# Patient Record
Sex: MALE | Race: WHITE | NOT HISPANIC OR LATINO | Employment: FULL TIME | URBAN - METROPOLITAN AREA
[De-identification: names, ages, dates, MRNs, and addresses within clinical notes are randomized per-mention and may not be internally consistent; named-entity substitution may affect disease eponyms.]

---

## 2019-07-26 ENCOUNTER — TELEPHONE (OUTPATIENT)
Dept: GASTROENTEROLOGY | Facility: CLINIC | Age: 59
End: 2019-07-26

## 2019-08-08 ENCOUNTER — OFFICE VISIT (OUTPATIENT)
Dept: GASTROENTEROLOGY | Facility: CLINIC | Age: 59
End: 2019-08-08
Payer: COMMERCIAL

## 2019-08-08 VITALS
HEIGHT: 67 IN | BODY MASS INDEX: 25.58 KG/M2 | DIASTOLIC BLOOD PRESSURE: 71 MMHG | WEIGHT: 163 LBS | SYSTOLIC BLOOD PRESSURE: 116 MMHG

## 2019-08-08 DIAGNOSIS — K21.9 GASTROESOPHAGEAL REFLUX DISEASE, ESOPHAGITIS PRESENCE NOT SPECIFIED: Primary | ICD-10-CM

## 2019-08-08 DIAGNOSIS — R10.13 DYSPEPSIA: ICD-10-CM

## 2019-08-08 DIAGNOSIS — R63.4 WEIGHT LOSS: ICD-10-CM

## 2019-08-08 DIAGNOSIS — R10.11 RUQ PAIN: ICD-10-CM

## 2019-08-08 PROCEDURE — 99999 PR PBB SHADOW E&M-EST. PATIENT-LVL III: CPT | Mod: PBBFAC,,, | Performed by: INTERNAL MEDICINE

## 2019-08-08 PROCEDURE — 99999 PR PBB SHADOW E&M-EST. PATIENT-LVL III: ICD-10-PCS | Mod: PBBFAC,,, | Performed by: INTERNAL MEDICINE

## 2019-08-08 PROCEDURE — 99205 OFFICE O/P NEW HI 60 MIN: CPT | Mod: S$GLB,,, | Performed by: INTERNAL MEDICINE

## 2019-08-08 PROCEDURE — 99205 PR OFFICE/OUTPT VISIT, NEW, LEVL V, 60-74 MIN: ICD-10-PCS | Mod: S$GLB,,, | Performed by: INTERNAL MEDICINE

## 2019-08-08 NOTE — PROGRESS NOTES
GASTROENTEROLOGY CLINIC NOTE    Reason for visit: The primary encounter diagnosis was Gastroesophageal reflux disease, esophagitis presence not specified. Diagnoses of Weight loss, Dyspepsia, and RUQ pain were also pertinent to this visit.  Referring provider/PCP: Ozzie Alvarado MD      HPI:  Hawk Dunham is a 59 y.o. male here today for new patient eval for weight loss, reflux, epigastric pain, and RUQ pain. Accompanied by his wife today.    He has had many years of epigastric distress as well as some retrosternal burning, worse at night and worse after meals.  He takes Tums regularly at night with some relief of the symptoms.  He is also taking his wife's Prilosec at times with decent relief as well. He is currently not on any prescribed daily medications he has had 2 bouts of right upper quadrant pain, below the ribcage, both times being after meals.  The pain was colicky in nature.  He admits to approximately 20 lb weight loss over the past few years.  He has episodes of bloating and indigestion daily related to these symptoms. Denies SOB. He denies intentional weight loss.  He denies fear of eating.  Has never had an upper endoscopy.  No changes in bowel habits, no blood in the stool, no vomiting.    Social, works as . Recently quit smoking, and I congratulated him on this.      GI ROS:  Reflux: yes  Dysphagia: No   Constipation: No  Diarrhea: No  Rectal bleeding/Melena/hematemesis: No  NSAIDs: No  Anticoagulation: No  Anti-platelet therapy: No    Prior Endoscopy:  EGD: none    Colon:  Around 2011 - colonoscopy , he states normal, recd 10 year repeat. (done in [place], no prior records avail)    (Portions of this note were dictated using voice recognition software and may contain dictation related errors in spelling/grammar/syntax not found on text review)    Review of Systems   Constitutional: Positive for weight loss. Negative for fever.   HENT: Negative for hearing loss and  "nosebleeds.    Eyes: Negative for double vision and photophobia.   Respiratory: Negative for cough and shortness of breath.    Cardiovascular: Negative for chest pain and leg swelling.   Gastrointestinal: Positive for abdominal pain, heartburn and nausea. Negative for blood in stool and melena.   Genitourinary: Negative for dysuria and hematuria.   Musculoskeletal: Negative for back pain and neck pain.   Skin: Negative for itching and rash.   Neurological: Negative for dizziness and headaches.   Psychiatric/Behavioral: Negative for hallucinations. The patient does not have insomnia.        Past Medical History: has a past medical history of Hyperlipidemia.    Past Surgical History: has a past surgical history that includes Appendectomy and Knee arthroscopy w/ meniscal repair.    Family History:family history is not on file.    Allergies: Review of patient's allergies indicates:  No Known Allergies    Social History: reports that he has been smoking.  He has a 40.00 pack-year smoking history. He does not have any smokeless tobacco history on file. He reports that he drinks alcohol.    Home medications:   Current Outpatient Medications on File Prior to Visit   Medication Sig Dispense Refill    [DISCONTINUED] simvastatin (ZOCOR) 40 MG tablet Take 40 mg by mouth nightly.  10    [DISCONTINUED] hydrocodone-acetaminophen 5-325mg (NORCO) 5-325 mg per tablet Take 1-2 tablets by mouth every 4 to 6 hours as needed for Pain. 50 tablet 0    [DISCONTINUED] ketorolac (TORADOL) 10 mg tablet Take 1 tablet (10 mg total) by mouth every 6 (six) hours. 50 tablet 0     No current facility-administered medications on file prior to visit.        Vital signs:  /71   Ht 5' 7" (1.702 m)   Wt 73.9 kg (163 lb)   BMI 25.53 kg/m²     Physical Exam   Constitutional: He is oriented to person, place, and time. He appears well-developed and well-nourished. No distress.   HENT:   Head: Normocephalic and atraumatic.   Eyes: Conjunctivae are " normal. No scleral icterus.   Neck: Neck supple. No thyromegaly present.   Cardiovascular: Normal rate, regular rhythm and intact distal pulses.   Pulmonary/Chest: Effort normal and breath sounds normal. No respiratory distress.   Abdominal:   Soft nontender ; nonrigid; no masses   Musculoskeletal: Normal range of motion. He exhibits no tenderness.   Neurological: He is alert and oriented to person, place, and time.   Skin: Skin is warm and dry. No rash noted.   Psychiatric: He has a normal mood and affect. His behavior is normal.   Vitals reviewed.      Routine labs:  Lab Results   Component Value Date    WBC 8.32 09/06/2016    HGB 15.6 09/06/2016    HCT 46.7 09/06/2016    MCV 98 09/06/2016     (L) 09/06/2016     Lab Results   Component Value Date    INR 0.9 11/01/2013     No results found for: IRON, FERRITIN, TIBC, FESATURATED  Lab Results   Component Value Date     09/06/2016    K 4.2 09/06/2016     09/06/2016    CO2 28 09/06/2016    BUN 12 09/06/2016    CREATININE 0.8 09/06/2016     No results found for: ALBUMIN, ALT, AST, GGT, ALKPHOS, BILITOT  No results found for: GLUCOSE    Imaging:  Reviewed  Reviewed CT from 2016 - fatty liver and cholelithiasis.    Assessment:    1. Gastroesophageal reflux disease, esophagitis presence not specified    2. Weight loss    3. Dyspepsia    4. RUQ pain        Plan:    Orders Placed This Encounter    US Abdomen Limited    Case request GI: EGD (ESOPHAGOGASTRODUODENOSCOPY)     I suspect that some of his symptoms are related to GERD as well as possible biliary colic.  I will start with an upper endoscopy and obtain a right upper quadrant ultrasound to further evaluate.    We discussed that he should also undergo repeat screening colonoscopy in the next year or two based on his 10 year interval last time.      RTC  2 months      Nigel Barker MD  [facility] [place]

## 2019-08-08 NOTE — PATIENT INSTRUCTIONS
TIPS for GERD:  If taking PPI, always take at least 45 minutes before first protein containing meal of the day  Stop smoking  Do NOT lie down within 2 -3 hours of eating  Elevate the head of your bed 6 inches (blocks under the head of the bed are better than pillows)   - consider MedCline pillow  Exercise and weight loss   Drink more water (between meals)                EGD Prep Instructions    [facility] [address]  Clinic Office [phone]  Endoscopy Lab [phone]    You are scheduled for an EGD with Dr. Barker on 9/4/19 at [facility].  You will check in at [facility] on the first floor of the hospital.    Nothing to eat or drink after midnight before the procedure.  You MAY brush your teeth.    You MAY take your blood pressure, heart, and seizure medication on the morning of the procedure, with a SIP of water.  Hold ALL other medications until after the procedure.    If you are on blood thinners THAT YOU HAVE BEEN INSTRUCTED TO HOLD BY YOUR DOCTOR FOR THIS PROCEDURE, then do NOT take this the morning of your EGD.  Do NOT stop these medications on your own, they must be approved to be held by your doctor.  Your EGD can NOT be done if you are on these medications.  Examples of blood thinners include: Coumadin, Aggrenox, Plavix, Pradaxa, Reapro, Pletal, Xarelto, Ticagrelor, Brilinta, Eliquis, and high dose aspirin (325 mg).  You do not have to stop baby aspirin 81 mg.    You will receive a call 2 days before your EGD to tell you the time to arrive.  If you have not received a call by the day before your procedure, call the Endoscopy Lab at [phone].

## 2019-08-21 ENCOUNTER — TELEPHONE (OUTPATIENT)
Dept: GASTROENTEROLOGY | Facility: CLINIC | Age: 59
End: 2019-08-21

## 2019-08-21 NOTE — TELEPHONE ENCOUNTER
Spoke with patients wife and she would like to cancel the procedure because they don't have the money to pay the co-pay.

## 2021-02-14 ENCOUNTER — HOSPITAL ENCOUNTER (EMERGENCY)
Facility: HOSPITAL | Age: 61
Discharge: HOME | End: 2021-02-14
Attending: EMERGENCY MEDICINE
Payer: COMMERCIAL

## 2021-02-14 VITALS
WEIGHT: 150 LBS | RESPIRATION RATE: 19 BRPM | HEIGHT: 67 IN | TEMPERATURE: 98 F | BODY MASS INDEX: 23.54 KG/M2 | DIASTOLIC BLOOD PRESSURE: 89 MMHG | SYSTOLIC BLOOD PRESSURE: 143 MMHG | OXYGEN SATURATION: 99 % | HEART RATE: 46 BPM

## 2021-02-14 DIAGNOSIS — R07.9 CHEST PAIN: ICD-10-CM

## 2021-02-14 DIAGNOSIS — R09.1 PLEURISY: Primary | ICD-10-CM

## 2021-02-14 LAB
ALBUMIN SERPL BCP-MCNC: 4 G/DL (ref 3.5–5.2)
ALP SERPL-CCNC: 40 U/L (ref 55–135)
ALT SERPL W/O P-5'-P-CCNC: 74 U/L (ref 10–44)
ANION GAP SERPL CALC-SCNC: 5 MMOL/L (ref 8–16)
AST SERPL-CCNC: 55 U/L (ref 10–40)
BASOPHILS # BLD AUTO: 0.04 K/UL (ref 0–0.2)
BASOPHILS NFR BLD: 0.8 % (ref 0–1.9)
BILIRUB SERPL-MCNC: 0.6 MG/DL (ref 0.1–1)
BNP SERPL-MCNC: 46 PG/ML (ref 0–99)
BUN SERPL-MCNC: 9 MG/DL (ref 8–23)
CALCIUM SERPL-MCNC: 8.7 MG/DL (ref 8.7–10.5)
CHLORIDE SERPL-SCNC: 106 MMOL/L (ref 95–110)
CO2 SERPL-SCNC: 29 MMOL/L (ref 23–29)
CREAT SERPL-MCNC: 0.8 MG/DL (ref 0.5–1.4)
D DIMER PPP IA.FEU-MCNC: 1.86 MG/L FEU
DIFFERENTIAL METHOD: ABNORMAL
EOSINOPHIL # BLD AUTO: 0.2 K/UL (ref 0–0.5)
EOSINOPHIL NFR BLD: 3.9 % (ref 0–8)
ERYTHROCYTE [DISTWIDTH] IN BLOOD BY AUTOMATED COUNT: 13.2 % (ref 11.5–14.5)
EST. GFR  (AFRICAN AMERICAN): >60 ML/MIN/1.73 M^2
EST. GFR  (NON AFRICAN AMERICAN): >60 ML/MIN/1.73 M^2
GLUCOSE SERPL-MCNC: 99 MG/DL (ref 70–110)
HCT VFR BLD AUTO: 46.2 % (ref 40–54)
HGB BLD-MCNC: 15.8 G/DL (ref 14–18)
IMM GRANULOCYTES # BLD AUTO: 0.01 K/UL (ref 0–0.04)
IMM GRANULOCYTES NFR BLD AUTO: 0.2 % (ref 0–0.5)
LYMPHOCYTES # BLD AUTO: 1.6 K/UL (ref 1–4.8)
LYMPHOCYTES NFR BLD: 30.6 % (ref 18–48)
MCH RBC QN AUTO: 33.4 PG (ref 27–31)
MCHC RBC AUTO-ENTMCNC: 34.2 G/DL (ref 32–36)
MCV RBC AUTO: 98 FL (ref 82–98)
MONOCYTES # BLD AUTO: 0.6 K/UL (ref 0.3–1)
MONOCYTES NFR BLD: 11.3 % (ref 4–15)
NEUTROPHILS # BLD AUTO: 2.7 K/UL (ref 1.8–7.7)
NEUTROPHILS NFR BLD: 53.2 % (ref 38–73)
NRBC BLD-RTO: 0 /100 WBC
PLATELET # BLD AUTO: 106 K/UL (ref 150–350)
PLATELET BLD QL SMEAR: ABNORMAL
PMV BLD AUTO: 10.5 FL (ref 9.2–12.9)
POTASSIUM SERPL-SCNC: 4.2 MMOL/L (ref 3.5–5.1)
PROT SERPL-MCNC: 7.1 G/DL (ref 6–8.4)
RBC # BLD AUTO: 4.73 M/UL (ref 4.6–6.2)
SODIUM SERPL-SCNC: 140 MMOL/L (ref 136–145)
TROPONIN I SERPL DL<=0.01 NG/ML-MCNC: 0.01 NG/ML (ref 0–0.03)
WBC # BLD AUTO: 5.13 K/UL (ref 3.9–12.7)

## 2021-02-14 PROCEDURE — 85379 FIBRIN DEGRADATION QUANT: CPT

## 2021-02-14 PROCEDURE — 93005 ELECTROCARDIOGRAM TRACING: CPT

## 2021-02-14 PROCEDURE — 25000003 PHARM REV CODE 250: Performed by: EMERGENCY MEDICINE

## 2021-02-14 PROCEDURE — 93010 ELECTROCARDIOGRAM REPORT: CPT | Mod: ,,, | Performed by: INTERNAL MEDICINE

## 2021-02-14 PROCEDURE — 93010 EKG 12-LEAD: ICD-10-PCS | Mod: ,,, | Performed by: INTERNAL MEDICINE

## 2021-02-14 PROCEDURE — 96374 THER/PROPH/DIAG INJ IV PUSH: CPT | Mod: 59

## 2021-02-14 PROCEDURE — 63600175 PHARM REV CODE 636 W HCPCS: Performed by: EMERGENCY MEDICINE

## 2021-02-14 PROCEDURE — 80053 COMPREHEN METABOLIC PANEL: CPT

## 2021-02-14 PROCEDURE — 85025 COMPLETE CBC W/AUTO DIFF WBC: CPT

## 2021-02-14 PROCEDURE — 84484 ASSAY OF TROPONIN QUANT: CPT

## 2021-02-14 PROCEDURE — 25500020 PHARM REV CODE 255: Performed by: EMERGENCY MEDICINE

## 2021-02-14 PROCEDURE — 83880 ASSAY OF NATRIURETIC PEPTIDE: CPT

## 2021-02-14 PROCEDURE — 99285 EMERGENCY DEPT VISIT HI MDM: CPT | Mod: 25

## 2021-02-14 RX ORDER — MORPHINE SULFATE 2 MG/ML
2 INJECTION, SOLUTION INTRAMUSCULAR; INTRAVENOUS
Status: COMPLETED | OUTPATIENT
Start: 2021-02-14 | End: 2021-02-14

## 2021-02-14 RX ORDER — ASPIRIN 325 MG
325 TABLET ORAL
Status: COMPLETED | OUTPATIENT
Start: 2021-02-14 | End: 2021-02-14

## 2021-02-14 RX ADMIN — MORPHINE SULFATE 2 MG: 2 INJECTION, SOLUTION INTRAMUSCULAR; INTRAVENOUS at 07:02

## 2021-02-14 RX ADMIN — IOHEXOL 100 ML: 350 INJECTION, SOLUTION INTRAVENOUS at 07:02

## 2021-02-14 RX ADMIN — ASPIRIN 325 MG ORAL TABLET 325 MG: 325 PILL ORAL at 06:02

## 2021-04-13 ENCOUNTER — PATIENT MESSAGE (OUTPATIENT)
Dept: RESEARCH | Facility: HOSPITAL | Age: 61
End: 2021-04-13

## 2021-09-30 PROBLEM — G47.00 INSOMNIA: Status: ACTIVE | Noted: 2018-11-28

## 2021-11-30 ENCOUNTER — HOSPITAL ENCOUNTER (OUTPATIENT)
Dept: RADIOLOGY | Facility: HOSPITAL | Age: 61
Discharge: HOME | End: 2021-11-30
Attending: FAMILY MEDICINE
Payer: COMMERCIAL

## 2021-11-30 DIAGNOSIS — G89.29 CHRONIC PAIN OF LEFT ELBOW: ICD-10-CM

## 2021-11-30 DIAGNOSIS — M25.522 CHRONIC PAIN OF LEFT ELBOW: ICD-10-CM

## 2021-11-30 DIAGNOSIS — G89.29 CHRONIC BILATERAL LOW BACK PAIN WITHOUT SCIATICA: ICD-10-CM

## 2021-11-30 DIAGNOSIS — M54.50 CHRONIC BILATERAL LOW BACK PAIN WITHOUT SCIATICA: ICD-10-CM

## 2021-11-30 DIAGNOSIS — G89.29 CHRONIC PAIN OF RIGHT ELBOW: ICD-10-CM

## 2021-11-30 DIAGNOSIS — M54.2 NECK PAIN: ICD-10-CM

## 2021-11-30 DIAGNOSIS — M25.521 CHRONIC PAIN OF RIGHT ELBOW: ICD-10-CM

## 2021-11-30 PROCEDURE — 73070 X-RAY EXAM OF ELBOW: CPT | Mod: TC,FY,RT

## 2021-11-30 PROCEDURE — 73070 X-RAY EXAM OF ELBOW: CPT | Mod: TC,FY,LT

## 2021-11-30 PROCEDURE — 72100 XR LUMBAR SPINE AP AND LATERAL: ICD-10-PCS | Mod: 26,,, | Performed by: STUDENT IN AN ORGANIZED HEALTH CARE EDUCATION/TRAINING PROGRAM

## 2021-11-30 PROCEDURE — 72050 XR CERVICAL SPINE COMPLETE 5 VIEW: ICD-10-PCS | Mod: 26,,, | Performed by: RADIOLOGY

## 2021-11-30 PROCEDURE — 73070 X-RAY EXAM OF ELBOW: CPT | Mod: 26,LT,, | Performed by: RADIOLOGY

## 2021-11-30 PROCEDURE — 72100 X-RAY EXAM L-S SPINE 2/3 VWS: CPT | Mod: TC,FY

## 2021-11-30 PROCEDURE — 73070 XR ELBOW 2 VIEWS RIGHT: ICD-10-PCS | Mod: 26,76,RT, | Performed by: RADIOLOGY

## 2021-11-30 PROCEDURE — 73070 XR ELBOW 2 VIEWS LEFT: ICD-10-PCS | Mod: 26,LT,, | Performed by: RADIOLOGY

## 2021-11-30 PROCEDURE — 72050 X-RAY EXAM NECK SPINE 4/5VWS: CPT | Mod: TC,FY

## 2021-11-30 PROCEDURE — 72050 X-RAY EXAM NECK SPINE 4/5VWS: CPT | Mod: 26,,, | Performed by: RADIOLOGY

## 2021-11-30 PROCEDURE — 73070 X-RAY EXAM OF ELBOW: CPT | Mod: 26,76,RT, | Performed by: RADIOLOGY

## 2021-11-30 PROCEDURE — 72100 X-RAY EXAM L-S SPINE 2/3 VWS: CPT | Mod: 26,,, | Performed by: STUDENT IN AN ORGANIZED HEALTH CARE EDUCATION/TRAINING PROGRAM

## 2021-12-02 PROBLEM — M54.12 CERVICAL RADICULOPATHY: Status: ACTIVE | Noted: 2021-12-02

## 2021-12-02 PROBLEM — M54.16 LUMBAR RADICULOPATHY: Status: ACTIVE | Noted: 2021-12-02

## 2022-03-08 ENCOUNTER — TELEPHONE (OUTPATIENT)
Dept: ORTHOPEDICS | Facility: CLINIC | Age: 62
End: 2022-03-08
Payer: COMMERCIAL

## 2022-03-08 NOTE — TELEPHONE ENCOUNTER
Attempt to contact patient in regards to scheduling a referral appointment for bilateral elbow pain. Left message for patient to return call back to [phone]. Thanks.